# Patient Record
Sex: MALE | Race: ASIAN | NOT HISPANIC OR LATINO | URBAN - METROPOLITAN AREA
[De-identification: names, ages, dates, MRNs, and addresses within clinical notes are randomized per-mention and may not be internally consistent; named-entity substitution may affect disease eponyms.]

---

## 2023-11-28 ENCOUNTER — OFFICE VISIT (OUTPATIENT)
Dept: RHEUMATOLOGY | Facility: CLINIC | Age: 33
End: 2023-11-28
Payer: COMMERCIAL

## 2023-11-28 VITALS
SYSTOLIC BLOOD PRESSURE: 124 MMHG | HEIGHT: 70 IN | WEIGHT: 182.63 LBS | HEART RATE: 78 BPM | BODY MASS INDEX: 26.15 KG/M2 | DIASTOLIC BLOOD PRESSURE: 83 MMHG

## 2023-11-28 DIAGNOSIS — M45.9 ANKYLOSING SPONDYLITIS, UNSPECIFIED SITE OF SPINE: Primary | ICD-10-CM

## 2023-11-28 DIAGNOSIS — M54.9 CHRONIC BILATERAL BACK PAIN, UNSPECIFIED BACK LOCATION: ICD-10-CM

## 2023-11-28 DIAGNOSIS — H20.9 IRITIS OF BOTH EYES: ICD-10-CM

## 2023-11-28 DIAGNOSIS — G89.29 CHRONIC BILATERAL BACK PAIN, UNSPECIFIED BACK LOCATION: ICD-10-CM

## 2023-11-28 PROCEDURE — 3008F PR BODY MASS INDEX (BMI) DOCUMENTED: ICD-10-PCS | Mod: CPTII,S$GLB,, | Performed by: STUDENT IN AN ORGANIZED HEALTH CARE EDUCATION/TRAINING PROGRAM

## 2023-11-28 PROCEDURE — 3074F SYST BP LT 130 MM HG: CPT | Mod: CPTII,S$GLB,, | Performed by: STUDENT IN AN ORGANIZED HEALTH CARE EDUCATION/TRAINING PROGRAM

## 2023-11-28 PROCEDURE — 3008F BODY MASS INDEX DOCD: CPT | Mod: CPTII,S$GLB,, | Performed by: STUDENT IN AN ORGANIZED HEALTH CARE EDUCATION/TRAINING PROGRAM

## 2023-11-28 PROCEDURE — 3079F DIAST BP 80-89 MM HG: CPT | Mod: CPTII,S$GLB,, | Performed by: STUDENT IN AN ORGANIZED HEALTH CARE EDUCATION/TRAINING PROGRAM

## 2023-11-28 PROCEDURE — 99205 OFFICE O/P NEW HI 60 MIN: CPT | Mod: S$GLB,,, | Performed by: STUDENT IN AN ORGANIZED HEALTH CARE EDUCATION/TRAINING PROGRAM

## 2023-11-28 PROCEDURE — 3079F PR MOST RECENT DIASTOLIC BLOOD PRESSURE 80-89 MM HG: ICD-10-PCS | Mod: CPTII,S$GLB,, | Performed by: STUDENT IN AN ORGANIZED HEALTH CARE EDUCATION/TRAINING PROGRAM

## 2023-11-28 PROCEDURE — 99999 PR PBB SHADOW E&M-EST. PATIENT-LVL IV: CPT | Mod: PBBFAC,,, | Performed by: STUDENT IN AN ORGANIZED HEALTH CARE EDUCATION/TRAINING PROGRAM

## 2023-11-28 PROCEDURE — 99999 PR PBB SHADOW E&M-EST. PATIENT-LVL IV: ICD-10-PCS | Mod: PBBFAC,,, | Performed by: STUDENT IN AN ORGANIZED HEALTH CARE EDUCATION/TRAINING PROGRAM

## 2023-11-28 PROCEDURE — 3074F PR MOST RECENT SYSTOLIC BLOOD PRESSURE < 130 MM HG: ICD-10-PCS | Mod: CPTII,S$GLB,, | Performed by: STUDENT IN AN ORGANIZED HEALTH CARE EDUCATION/TRAINING PROGRAM

## 2023-11-28 PROCEDURE — 1159F MED LIST DOCD IN RCRD: CPT | Mod: CPTII,S$GLB,, | Performed by: STUDENT IN AN ORGANIZED HEALTH CARE EDUCATION/TRAINING PROGRAM

## 2023-11-28 PROCEDURE — 1159F PR MEDICATION LIST DOCUMENTED IN MEDICAL RECORD: ICD-10-PCS | Mod: CPTII,S$GLB,, | Performed by: STUDENT IN AN ORGANIZED HEALTH CARE EDUCATION/TRAINING PROGRAM

## 2023-11-28 PROCEDURE — 99205 PR OFFICE/OUTPT VISIT, NEW, LEVL V, 60-74 MIN: ICD-10-PCS | Mod: S$GLB,,, | Performed by: STUDENT IN AN ORGANIZED HEALTH CARE EDUCATION/TRAINING PROGRAM

## 2023-11-28 NOTE — PROGRESS NOTES
RHEUMATOLOGY OUTPATIENT CLINIC NOTE    11/28/2023    Attending Rheumatologist: Jerri Jesus  Primary Care Provider: No, Primary Doctor   Physician Requesting Consultation: Self, Aaareferral  No address on file  Chief Complaint/Reason For Consultation:  Joint Pain      Subjective:       HPI  Yandel Melo is a 33 y.o.  male with no past medical history who comes to establish care for ankylosing spondylitis.     No records available for review.     He was diagnosed with AS at age 22 when he developed low back and buttock pain, neck stiffness, had limited ROM. HLA-B27 positive and elevated markers of inflammation. He has XR done at the time, he does not recall if they were abnormal. No MRI has been done. He also developed iritis around the same time that has involved both eyes at different times.     No psoriasis, peripheral joint pain/swelling, abdominal pain, blood in stool.     Previous meds  -SSZ at dx for 1 year. Stopped for unknown reasons but he did not think it helped.   -Aleve as needed for 3 years.   -Enbrel started 2 years ago. Took for 1 year. He self tapered it as he did not want to take any medications. Has been off for the past 9 months.     Today reports that he has been doing well in regards to his back pain. Reports baseline mid to low back and neck stiffness, especially in the morning for 5-10 min, improves with stretching. He has not been very active lately.   However, iritis was quiet until 4 years ago when he has had a couple of episodes that required prednisolone drops except 3 months ago when he needed oral steroids. He follows with local ophthalmologist Dr. Johny Brown.   He moved from [place] about 6 months ago.    Does not smoke. Drinks alcohol socially. No illicit drugs. 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      Denies any family history of autoimmune conditions.     Chronic comorbid conditions affecting medical decision making today:  Past Medical  "History:   Diagnosis Date    Allergy     Ankylosing spondylitis of unspecified sites in spine      History reviewed. No pertinent surgical history.  History reviewed. No pertinent family history.  Social History     Substance and Sexual Activity   Alcohol Use Yes    Alcohol/week: 2.0 standard drinks of alcohol    Types: 2 Drinks containing 0.5 oz of alcohol per week    Comment: Socially     Social History     Tobacco Use   Smoking Status Never   Smokeless Tobacco Never     Social History     Substance and Sexual Activity   Drug Use Not on file     No current outpatient medications on file.     Objective:         Vitals:    11/28/23 1105   BP: 124/83   Pulse: 78     Physical Exam   Constitutional: He is oriented to person, place, and time. He appears well-developed.   HENT:   Head: Normocephalic.   Eyes: Conjunctivae are normal.   Cardiovascular: Normal rate.   Pulmonary/Chest: Effort normal.   Abdominal: Soft.   Musculoskeletal:      Cervical back: Neck supple.      Comments: Cspine FROM no tenderness  Tspine FROM no tenderness  Lspine FROM no tenderness.  Shoulders: FROM; no synovitis;  Elbows: FROM; no synovitis; no tophi or nodules  Wrists: FROM; no synovitis;    MCPs: FROM; no synovitis;   PIPs:FROM; no synovitis;   DIPs: FROM; no synovitis;   HIPS: FROM  Knees: FROM; no synovitis; no instability  Ankles: FROM: no synovitis   Toes: no tenderness on palpation.      Occipitut to wall 0 cm  Forward flexion normal.   Lateral flexion normal.    Neurological: He is alert and oriented to person, place, and time.   Skin: No rash noted.   No Heliotrope rash  No Gottron papules  No sclerodactyly   No Raynaud's  No Petechiae  No discoid lesions  No alopecia  Normal Capillaroscopy   Vitals reviewed.      Reviewed old and all outside pertinent medical records available.    All lab results personally reviewed and interpreted by me.  No results found for: "WBC", "HGB", "HCT", "MCV", "MCH", "MCHC", "RDW", "PLT", "MPV", " ""NEUTROABS", "LYMPHOABS", "MONOABS", "EOSINOABS", "BASOSABS", "NEUTROPCT", "LYMPHOPCT", "MONOPCT", "EOSINOPCT", "BASOPCT", "DIFFTYPE", "RBCMORPHOLOG", "PLTEST"    No results found for: "NA", "K", "CL", "CO2", "GLU", "BUN", "LABCREA", "CALCIUM", "PROT", "ALBUMIN", "BILITOT", "AST", "ALKPHOS", "ALT", "GFRAA", "GFRNONAA"    No results found for: "COLORU", "APPEARANCEUA", "SPECGRAV", "PHUR", "PHUA", "PROTEINUA", "GLUCOSEU", "KETONESU", "BLOODU", "LEUKOCYTESUR", "NITRITE", "UROBILINOGEN"    No results found for: "CRP"    No results found for: "AZEB", "RF", "SEDRATE", "CCPANTIBODIE"    No components found for: "25OHVITDTOT", "50LVRPEH8", "98PIVFRN2", "METHODNOTE"    No results found for: "URICACID"    No results found for: "QUANTIFERON", "HEPBCOREIGG", "HEPBSAB", "HEPBSAG", "HEPCAB"    Imaging:  All imaging reviewed and independently interpreted by me.     ASSESSMENT / PLAN:     Yandel Melo is a 33 y.o.  male with:    1. Ankylosing spondylitis, unspecified site of spine  -Diagnosed at age 22 when he had low back and buttock pain, neck stiffness. HLAB27 positive and elevated markers of inflammation. Also has history of bilateral iritis.   -No PsO, peripheral joint pain, abdominal pain, blood in stool.   -Meds tried: SSZ, Enbrel, NSAIDS. Prednisolone drops and recently prednisone oral for iritis.   -Obtain basic workup now including hepatitis panel and Quantiferon.   -Obtain XR of cervical, thoracic, lumbar spine, and sacroiliac joints.   -Referral to PT.   -He is not very symptomatic from the AS standpoint, so would recommend using NSAIDS as needed and continue stretching.   -given recent major iritis flare, can consider Humira in the future if iritis worsens.     2. Iritis of both eyes  -Diagnosis concomitant with AS around age 22.   -He remained stable until 4 years ago when he started having flares at least once a year that required prednisolone drops. However recently had major one that required oral prednisone. "   -Discussed with patient that if he develops a new flare       Follow up in about 3 months (around 2/28/2024).    Method of contact with patient concerns: [facility]    Disclaimer:  This note is prepared using voice recognition software and as such is likely to have errors and has not been proof read. Please contact me for questions.     Time spent: 60 minutes in face to face discussion concerning diagnosis, prognosis, review of lab and test results, benefits of treatment as well as management of disease, counseling of patient and coordination of care between various health care providers.  Greater than half the time spent was used for coordination of care and counseling of patient.    Jerri Jesus M.D.  Rheumatology  [facility]

## 2023-11-28 NOTE — PATIENT INSTRUCTIONS
Labs and XR today   Referral for physical therapy  Please let me know if you develop an episode of iritis again. If so, would likely start Humira.   Can take aleve as needed for back pain   Continue close follow up with Dr. Cantrell (Ophthalmology)

## 2023-11-28 NOTE — LETTER
November 28, 2023        Johny Brown MD  [address]  [address]  [place] [address]             [facility] - Rheumatology  [address]  [address]  [place] [address]  Phone: [phone]  Fax: [phone]   Patient: Yandel Melo   MR Number: [id number]   YOB: 1990   Date of Visit: 11/28/2023       Dear Dr. Brown:    Thank you for referring Yandel Melo to me for evaluation. Attached you will find relevant portions of my assessment and plan of care.    If you have questions, please do not hesitate to call me. I look forward to following Yandel Melo along with you.    Sincerely,      Jerri Jesus MD  Rheumatology          [facility]  [facility]    Enclosure

## 2023-12-01 ENCOUNTER — PATIENT MESSAGE (OUTPATIENT)
Dept: RHEUMATOLOGY | Facility: CLINIC | Age: 33
End: 2023-12-01
Payer: COMMERCIAL

## 2024-03-06 ENCOUNTER — OFFICE VISIT (OUTPATIENT)
Dept: RHEUMATOLOGY | Facility: CLINIC | Age: 34
End: 2024-03-06
Payer: COMMERCIAL

## 2024-03-06 VITALS
DIASTOLIC BLOOD PRESSURE: 88 MMHG | WEIGHT: 179.38 LBS | BODY MASS INDEX: 25.68 KG/M2 | HEART RATE: 77 BPM | SYSTOLIC BLOOD PRESSURE: 136 MMHG | HEIGHT: 70 IN

## 2024-03-06 DIAGNOSIS — M54.9 CHRONIC BILATERAL BACK PAIN, UNSPECIFIED BACK LOCATION: ICD-10-CM

## 2024-03-06 DIAGNOSIS — G89.29 CHRONIC BILATERAL BACK PAIN, UNSPECIFIED BACK LOCATION: ICD-10-CM

## 2024-03-06 DIAGNOSIS — H20.9 IRITIS OF BOTH EYES: ICD-10-CM

## 2024-03-06 DIAGNOSIS — M45.9 ANKYLOSING SPONDYLITIS, UNSPECIFIED SITE OF SPINE: Primary | ICD-10-CM

## 2024-03-06 PROCEDURE — 3008F BODY MASS INDEX DOCD: CPT | Mod: CPTII,S$GLB,, | Performed by: STUDENT IN AN ORGANIZED HEALTH CARE EDUCATION/TRAINING PROGRAM

## 2024-03-06 PROCEDURE — 99214 OFFICE O/P EST MOD 30 MIN: CPT | Mod: S$GLB,,, | Performed by: STUDENT IN AN ORGANIZED HEALTH CARE EDUCATION/TRAINING PROGRAM

## 2024-03-06 PROCEDURE — 3075F SYST BP GE 130 - 139MM HG: CPT | Mod: CPTII,S$GLB,, | Performed by: STUDENT IN AN ORGANIZED HEALTH CARE EDUCATION/TRAINING PROGRAM

## 2024-03-06 PROCEDURE — 3079F DIAST BP 80-89 MM HG: CPT | Mod: CPTII,S$GLB,, | Performed by: STUDENT IN AN ORGANIZED HEALTH CARE EDUCATION/TRAINING PROGRAM

## 2024-03-06 PROCEDURE — 1159F MED LIST DOCD IN RCRD: CPT | Mod: CPTII,S$GLB,, | Performed by: STUDENT IN AN ORGANIZED HEALTH CARE EDUCATION/TRAINING PROGRAM

## 2024-03-06 PROCEDURE — 99999 PR PBB SHADOW E&M-EST. PATIENT-LVL III: CPT | Mod: PBBFAC,,, | Performed by: STUDENT IN AN ORGANIZED HEALTH CARE EDUCATION/TRAINING PROGRAM

## 2024-03-06 NOTE — PROGRESS NOTES
RHEUMATOLOGY OUTPATIENT CLINIC NOTE    3/6/2024    Attending Rheumatologist: Jerri Jesus  Primary Care Provider: No, Primary Doctor   Physician Requesting Consultation: No referring provider defined for this encounter.  Chief Complaint/Reason For Consultation:  Ankylosing spondylitis, unspecified site of spine      Subjective:       HPI  Yandel Melo is a 33 y.o.  male with no past medical history who comes to establish care for ankylosing spondylitis.     Interim history  -Initial consult on 11/2023  -Stable from AS standpoint. Continues to not have any issues, had some back pain recently that resolved on its own. Did not take any NSAIDS. He was supposed to see PT but they did not call him back to get it scheduled.   -He has not have any flares or iritis.   -he brought some lab records from previous rheumatologist that showed negative AZEB. Had mild elevated LFTs in 2013.     Answers submitted by the patient for this visit:  Rheumatology Questionnaire (Submitted on 3/6/2024)  fever: No  eye redness: No  mouth sores: No  headaches: No  shortness of breath: No  chest pain: No  trouble swallowing: No  diarrhea: No  constipation: No  unexpected weight change: No  genital sore: No  During the last 3 days, have you had a skin rash?: No  Bruises or bleeds easily: No  cough: No      Disease history    He was diagnosed with AS at age 22 when he developed low back and buttock pain, neck stiffness, had limited ROM. HLA-B27 positive and elevated markers of inflammation. He has XR done at the time, he does not recall if they were abnormal. No MRI has been done. He also developed iritis around the same time that has involved both eyes at different times.     No psoriasis, peripheral joint pain/swelling, abdominal pain, blood in stool.     Previous meds  -SSZ at dx for 1 year. Stopped for unknown reasons but he did not think it helped.   -Aleve as needed for 3 years.   -Enbrel started 2 years ago. Took for 1  year. He self tapered it as he did not want to take any medications. Has been off for the past 9 months.     Today reports that he has been doing well in regards to his back pain. Reports baseline mid to low back and neck stiffness, especially in the morning for 5-10 min, improves with stretching. He has not been very active lately.   However, iritis was quiet until 4 years ago when he has had a couple of episodes that required prednisolone drops except 3 months ago when he needed oral steroids. He follows with local ophthalmologist Dr. Johny Brown.   He moved from [place] about 6 months ago.    Does not smoke. Drinks alcohol socially. No illicit drugs. 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      Denies any family history of autoimmune conditions.     Chronic comorbid conditions affecting medical decision making today:  Past Medical History:   Diagnosis Date    Allergy     Ankylosing spondylitis of unspecified sites in spine      History reviewed. No pertinent surgical history.  History reviewed. No pertinent family history.  Social History     Substance and Sexual Activity   Alcohol Use Yes    Alcohol/week: 2.0 standard drinks of alcohol    Types: 2 Drinks containing 0.5 oz of alcohol per week    Comment: Socially     Social History     Tobacco Use   Smoking Status Never   Smokeless Tobacco Never     Social History     Substance and Sexual Activity   Drug Use Not on file     No current outpatient medications on file.     Objective:         Vitals:    03/06/24 1041   BP: 136/88   Pulse: 77     Physical Exam   Constitutional: He is oriented to person, place, and time. He appears well-developed.   HENT:   Head: Normocephalic.   Eyes: Conjunctivae are normal.   Cardiovascular: Normal rate.   Pulmonary/Chest: Effort normal.   Abdominal: Soft.   Musculoskeletal:      Cervical back: Neck supple.      Comments: Cspine FROM no tenderness  Tspine FROM no tenderness  Lspine FROM no tenderness.  Shoulders: FROM;  "no synovitis;  Elbows: FROM; no synovitis; no tophi or nodules  Wrists: FROM; no synovitis;    MCPs: FROM; no synovitis;   PIPs:FROM; no synovitis;   DIPs: FROM; no synovitis;   HIPS: FROM  Knees: FROM; no synovitis; no instability  Ankles: FROM: no synovitis   Toes: no tenderness on palpation.      Occipitut to wall 0 cm  Forward flexion normal.   Lateral flexion normal.    Neurological: He is alert and oriented to person, place, and time.   Skin: No rash noted.   No Heliotrope rash  No Gottron papules  No sclerodactyly   No Raynaud's  No Petechiae  No discoid lesions  No alopecia  Normal Capillaroscopy   Vitals reviewed.      Reviewed old and all outside pertinent medical records available.    All lab results personally reviewed and interpreted by me.  Lab Results   Component Value Date    WBC 6.76 11/28/2023    HGB 14.9 11/28/2023    HCT 44.4 11/28/2023    MCV 88 11/28/2023    MCH 29.7 11/28/2023    MCHC 33.6 11/28/2023    RDW 12.3 11/28/2023     11/28/2023    MPV 9.3 11/28/2023       Lab Results   Component Value Date     11/28/2023    K 4.3 11/28/2023     11/28/2023    CO2 26 11/28/2023     11/28/2023    BUN 16 11/28/2023    CALCIUM 9.7 11/28/2023    PROT 8.3 11/28/2023    ALBUMIN 4.6 11/28/2023    BILITOT 0.6 11/28/2023    AST 36 11/28/2023    ALKPHOS 53 11/28/2023    ALT 35 11/28/2023       No results found for: "COLORU", "APPEARANCEUA", "SPECGRAV", "PHUR", "PHUA", "PROTEINUA", "GLUCOSEU", "KETONESU", "BLOODU", "LEUKOCYTESUR", "NITRITE", "UROBILINOGEN"    Lab Results   Component Value Date    CRP 10.3 (H) 11/28/2023       Lab Results   Component Value Date    SEDRATE 5 11/28/2023       No components found for: "25OHVITDTOT", "36WWASIK0", "06CFSXIS1", "METHODNOTE"    No results found for: "URICACID"    Lab Results   Component Value Date    HEPBSAB <3.00 11/28/2023    HEPBSAB Non-reactive 11/28/2023    HEPBSAG Non-reactive 11/28/2023    HEPCAB Non-reactive 11/28/2023 "       Imaging:  All imaging reviewed and independently interpreted by me.     ASSESSMENT / PLAN:     Yandel Melo is a 33 y.o.  male with:    1. Ankylosing spondylitis, unspecified site of spine  -Diagnosed at age 22 when he had low back and buttock pain, neck stiffness. HLAB27 positive and elevated markers of inflammation. Also has history of bilateral iritis.   -No PsO, peripheral joint pain, abdominal pain, blood in stool.   -Meds tried: SSZ, Enbrel, NSAIDS. Prednisolone drops and recently prednisone oral for iritis.   -Labs unremarkable with exception of mild elevation of CRP likely from acute flare of iritis.   -XR of spine unremarkable  -He is not very symptomatic from the AS standpoint, so would recommend using NSAIDS as needed and continue stretching. Ccan consider Humira in the future if iritis worsens.     2. Iritis of both eyes  -Diagnosis concomitant with AS around age 22.   -He remained stable until 4 years ago when he started having flares at least once a year that required prednisolone drops. Had a major flare that required oral prednisone in Mid October 2023. No recurrence so far.   -follows with ophthalmology.   -Discussed with patient that if he develops a new flare, will consider Humira.     Follow up in about 6 months (around 9/6/2024).    Method of contact with patient concerns: Guy attn Rheumatology    Disclaimer:  This note is prepared using voice recognition software and as such is likely to have errors and has not been proof read. Please contact me for questions.     Time spent: 30 minutes in face to face discussion concerning diagnosis, prognosis, review of lab and test results, benefits of treatment as well as management of disease, counseling of patient and coordination of care between various health care providers.  Greater than half the time spent was used for coordination of care and counseling of patient.    Jerri Jesus M.D.  Rheumatology  [facility]

## 2024-03-06 NOTE — PROGRESS NOTES
3/6/2024    10:38 AM   Rapid3 Question Responses and Scores   MDHAQ Score 0.1   Psychologic Score 1.1   Pain Score 4.5   When you awakened in the morning OVER THE LAST WEEK, did you feel stiff? Yes   If Yes, please indicate the number of hours until you are as limber as you will be for the day 2   Fatigue Score 0   Global Health Score 0   RAPID3 Score 1.61

## 2024-08-29 ENCOUNTER — PATIENT MESSAGE (OUTPATIENT)
Dept: RHEUMATOLOGY | Facility: CLINIC | Age: 34
End: 2024-08-29
Payer: COMMERCIAL

## 2024-12-04 ENCOUNTER — PATIENT MESSAGE (OUTPATIENT)
Dept: RHEUMATOLOGY | Facility: CLINIC | Age: 34
End: 2024-12-04
Payer: COMMERCIAL

## 2024-12-04 DIAGNOSIS — H20.9 IRITIS OF BOTH EYES: ICD-10-CM

## 2024-12-04 DIAGNOSIS — M45.9 ANKYLOSING SPONDYLITIS, UNSPECIFIED SITE OF SPINE: Primary | ICD-10-CM

## 2025-01-13 ENCOUNTER — OFFICE VISIT (OUTPATIENT)
Dept: RHEUMATOLOGY | Facility: CLINIC | Age: 35
End: 2025-01-13
Payer: COMMERCIAL

## 2025-01-13 VITALS
SYSTOLIC BLOOD PRESSURE: 154 MMHG | DIASTOLIC BLOOD PRESSURE: 96 MMHG | WEIGHT: 183.88 LBS | HEIGHT: 70 IN | BODY MASS INDEX: 26.33 KG/M2

## 2025-01-13 DIAGNOSIS — Z79.899 DRUG-INDUCED IMMUNODEFICIENCY: ICD-10-CM

## 2025-01-13 DIAGNOSIS — D84.821 DRUG-INDUCED IMMUNODEFICIENCY: ICD-10-CM

## 2025-01-13 DIAGNOSIS — H20.9 IRITIS OF BOTH EYES: ICD-10-CM

## 2025-01-13 DIAGNOSIS — M45.9 ANKYLOSING SPONDYLITIS, UNSPECIFIED SITE OF SPINE: Primary | ICD-10-CM

## 2025-01-13 PROCEDURE — 3080F DIAST BP >= 90 MM HG: CPT | Mod: CPTII,S$GLB,, | Performed by: STUDENT IN AN ORGANIZED HEALTH CARE EDUCATION/TRAINING PROGRAM

## 2025-01-13 PROCEDURE — 3077F SYST BP >= 140 MM HG: CPT | Mod: CPTII,S$GLB,, | Performed by: STUDENT IN AN ORGANIZED HEALTH CARE EDUCATION/TRAINING PROGRAM

## 2025-01-13 PROCEDURE — 99999 PR PBB SHADOW E&M-EST. PATIENT-LVL III: CPT | Mod: PBBFAC,,, | Performed by: STUDENT IN AN ORGANIZED HEALTH CARE EDUCATION/TRAINING PROGRAM

## 2025-01-13 PROCEDURE — 99215 OFFICE O/P EST HI 40 MIN: CPT | Mod: S$GLB,,, | Performed by: STUDENT IN AN ORGANIZED HEALTH CARE EDUCATION/TRAINING PROGRAM

## 2025-01-13 PROCEDURE — 1159F MED LIST DOCD IN RCRD: CPT | Mod: CPTII,S$GLB,, | Performed by: STUDENT IN AN ORGANIZED HEALTH CARE EDUCATION/TRAINING PROGRAM

## 2025-01-13 PROCEDURE — 3008F BODY MASS INDEX DOCD: CPT | Mod: CPTII,S$GLB,, | Performed by: STUDENT IN AN ORGANIZED HEALTH CARE EDUCATION/TRAINING PROGRAM

## 2025-01-13 RX ORDER — PREDNISOLONE ACETATE 10 MG/ML
SUSPENSION/ DROPS OPHTHALMIC
COMMUNITY
Start: 2024-12-09

## 2025-01-13 RX ORDER — ADALIMUMAB 40MG/0.4ML
40 KIT SUBCUTANEOUS
Qty: 2 PEN | Refills: 11 | Status: ACTIVE | OUTPATIENT
Start: 2025-01-13 | End: 2025-02-03

## 2025-01-13 RX ORDER — PREDNISONE 20 MG/1
TABLET ORAL
COMMUNITY
Start: 2024-12-13

## 2025-01-13 NOTE — PROGRESS NOTES
1/13/2025    11:52 AM   Rapid3 Question Responses and Scores   MDHAQ Score 0.2   Psychologic Score 1.1   Pain Score 4   When you awakened in the morning OVER THE LAST WEEK, did you feel stiff? Yes   If Yes, please indicate the number of hours until you are as limber as you will be for the day 3   Fatigue Score 2   Global Health Score 3   RAPID3 Score 2.56

## 2025-01-13 NOTE — PROGRESS NOTES
RHEUMATOLOGY OUTPATIENT CLINIC NOTE    1/13/2025    Attending Rheumatologist: Jerri Jesus  Primary Care Provider: No, Primary Doctor   Physician Requesting Consultation: No referring provider defined for this encounter.  Chief Complaint/Reason For Consultation:  Ankylosing spondylitis, unspecified site of spine      Subjective:       HPI  Yandel Melo is a 34 y.o.  male with no past medical history who comes to establish care for ankylosing spondylitis.     Interim history  -last visit on 3/2024  -he has been doing well until mid December, when he developed flare of iritis that required both PO steroids and drops of prednisolone. He is currently on prednisone 10 mg every other day and he will be stopping this upcoming Wednesday.   -He reports that his back pain has slowly worsened. He noted waking up at 4 am or so he wakes up with pain and improves in the middle of the day.     Answers submitted by the patient for this visit:  Rheumatology Questionnaire (Submitted on 1/13/2025)  fever: No  eye redness: Yes  mouth sores: No  headaches: No  shortness of breath: No  chest pain: No  trouble swallowing: No  diarrhea: No  constipation: No  unexpected weight change: No  genital sore: No  During the last 3 days, have you had a skin rash?: No  Bruises or bleeds easily: No  cough: No      Disease history    He was diagnosed with AS at age 22 when he developed low back and buttock pain, neck stiffness, had limited ROM. HLA-B27 positive and elevated markers of inflammation. He has XR done at the time, he does not recall if they were abnormal. No MRI has been done. He also developed iritis around the same time that has involved both eyes at different times.     No psoriasis, peripheral joint pain/swelling, abdominal pain, blood in stool.     Previous meds  -SSZ at dx for 1 year. Stopped for unknown reasons but he did not think it helped.   -Aleve as needed for 3 years.   -Enbrel started 2 years ago. Took  for 1 year. He self tapered it as he did not want to take any medications. Has been off for the past 9 months.     Today reports that he has been doing well in regards to his back pain. Reports baseline mid to low back and neck stiffness, especially in the morning for 5-10 min, improves with stretching. He has not been very active lately.   However, iritis was quiet until 4 years ago when he has had a couple of episodes that required prednisolone drops except 3 months ago when he needed oral steroids. He follows with local ophthalmologist Dr. Johny Brown.   He moved from [place] about 6 months ago.    Does not smoke. Drinks alcohol socially. No illicit drugs. 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      Denies any family history of autoimmune conditions.     Chronic comorbid conditions affecting medical decision making today:  Past Medical History:   Diagnosis Date    Allergy     Ankylosing spondylitis of unspecified sites in spine      History reviewed. No pertinent surgical history.  No family history on file.  Social History     Substance and Sexual Activity   Alcohol Use Yes    Alcohol/week: 2.0 standard drinks of alcohol    Types: 2 Drinks containing 0.5 oz of alcohol per week    Comment: Socially     Social History     Tobacco Use   Smoking Status Never   Smokeless Tobacco Never     Social History     Substance and Sexual Activity   Drug Use Not on file       Current Outpatient Medications:     PRED FORTE 1 % DrpS, , Disp: , Rfl:     predniSONE (DELTASONE) 20 MG tablet, , Disp: , Rfl:     adalimumab (HUMIRA,CF, PEN) 40 mg/0.4 mL PnKt, Inject 0.4 mLs (40 mg total) into the skin every 14 (fourteen) days., Disp: 2 pen , Rfl: 11     Objective:         Vitals:    01/13/25 1340   BP: (!) 154/96     Physical Exam   Constitutional: He is oriented to person, place, and time. He appears well-developed.   HENT:   Head: Normocephalic.   Eyes: Conjunctivae are normal.   Cardiovascular: Normal rate.  "  Pulmonary/Chest: Effort normal.   Abdominal: Soft.   Musculoskeletal:      Cervical back: Neck supple.      Comments: Cspine FROM no tenderness  Tspine FROM no tenderness  Lspine FROM no tenderness.  Shoulders: FROM; no synovitis;  Elbows: FROM; no synovitis; no tophi or nodules  Wrists: FROM; no synovitis;    MCPs: FROM; no synovitis;   PIPs:FROM; no synovitis;   DIPs: FROM; no synovitis;   HIPS: FROM  Knees: FROM; no synovitis; no instability  Ankles: FROM: no synovitis   Toes: no tenderness on palpation.      Occipitut to wall 0 cm  Forward flexion normal.   Lateral flexion normal.    Neurological: He is alert and oriented to person, place, and time.   Skin: No rash noted.   No Heliotrope rash  No Gottron papules  No sclerodactyly   No Raynaud's  No Petechiae  No discoid lesions  No alopecia  Normal Capillaroscopy   Vitals reviewed.      Reviewed old and all outside pertinent medical records available.    All lab results personally reviewed and interpreted by me.  Lab Results   Component Value Date    WBC 6.76 11/28/2023    HGB 14.9 11/28/2023    HCT 44.4 11/28/2023    MCV 88 11/28/2023    MCH 29.7 11/28/2023    MCHC 33.6 11/28/2023    RDW 12.3 11/28/2023     11/28/2023    MPV 9.3 11/28/2023       Lab Results   Component Value Date     11/28/2023    K 4.3 11/28/2023     11/28/2023    CO2 26 11/28/2023     11/28/2023    BUN 16 11/28/2023    CALCIUM 9.7 11/28/2023    PROT 8.3 11/28/2023    ALBUMIN 4.6 11/28/2023    BILITOT 0.6 11/28/2023    AST 36 11/28/2023    ALKPHOS 53 11/28/2023    ALT 35 11/28/2023       No results found for: "COLORU", "APPEARANCEUA", "SPECGRAV", "PHUR", "PHUA", "PROTEINUA", "GLUCOSEU", "KETONESU", "BLOODU", "LEUKOCYTESUR", "NITRITE", "UROBILINOGEN"    Lab Results   Component Value Date    CRP 10.3 (H) 11/28/2023       Lab Results   Component Value Date    SEDRATE 5 11/28/2023       No components found for: "25OHVITDTOT", "45TRLFFA1", "75HVDKTD2", "METHODNOTE"    No " "results found for: "URICACID"    Lab Results   Component Value Date    HEPBSAB <3.00 11/28/2023    HEPBSAB Non-reactive 11/28/2023    HEPBSAG Non-reactive 11/28/2023    HEPCAB Non-reactive 11/28/2023       Imaging:  All imaging reviewed and independently interpreted by me.     ASSESSMENT / PLAN:     Yandel Melo is a 34 y.o.  male with:    1. Ankylosing spondylitis, unspecified site of spine -active  -Diagnosed at age 22 when he had low back and buttock pain, neck stiffness. HLAB27 positive and elevated markers of inflammation. Also has history of bilateral iritis.   -No PsO, peripheral joint pain, abdominal pain, blood in stool.   -Meds tried: SSZ, Enbrel, NSAIDS. Prednisolone drops and recently prednisone oral for iritis.   -XR of spine unremarkable  -symptoms worsened recently with active iritis and worsening back pain. Will proceed with starting Humira 40 mg SC every 2 weeks. Discussed SE profile of Humira. He voiced understanding and agrees with starting medication.     2. Iritis of both eyes- active   -Diagnosis concomitant with AS around age 22.   -He remained stable until 4 years ago when he started having flares at least once a year that required prednisolone drops. Had a major flare in December 2024 that required PO steroids   -follows with ophthalmology Dr. Johny Brown. Will obtain records from him.   -management as above too. Humira should help.     3. Drug induced immunodeficiency  - obtain labs now   - no live vaccines  - vaccines per guidelines   - immunosuppression/infectious precautions reinforced      No follow-ups on file.    Method of contact with patient concerns: Guy attn Rheumatology    Disclaimer:  This note is prepared using voice recognition software and as such is likely to have errors and has not been proof read. Please contact me for questions.   patient.    Jerri Jesus M.D.  Rheumatology  [facility]    "

## 2025-09-04 ENCOUNTER — OFFICE VISIT (OUTPATIENT)
Dept: RHEUMATOLOGY | Facility: CLINIC | Age: 35
End: 2025-09-04
Payer: COMMERCIAL

## 2025-09-04 VITALS
HEIGHT: 70 IN | WEIGHT: 179.25 LBS | HEART RATE: 70 BPM | SYSTOLIC BLOOD PRESSURE: 145 MMHG | BODY MASS INDEX: 25.66 KG/M2 | DIASTOLIC BLOOD PRESSURE: 94 MMHG | OXYGEN SATURATION: 98 % | RESPIRATION RATE: 19 BRPM

## 2025-09-04 DIAGNOSIS — M45.9 ANKYLOSING SPONDYLITIS, UNSPECIFIED SITE OF SPINE: ICD-10-CM

## 2025-09-04 DIAGNOSIS — Z79.899 DRUG-INDUCED IMMUNODEFICIENCY: Primary | ICD-10-CM

## 2025-09-04 DIAGNOSIS — H20.9 IRITIS OF BOTH EYES: ICD-10-CM

## 2025-09-04 DIAGNOSIS — D84.821 DRUG-INDUCED IMMUNODEFICIENCY: Primary | ICD-10-CM

## 2025-09-04 PROCEDURE — 3080F DIAST BP >= 90 MM HG: CPT | Mod: CPTII,S$GLB,, | Performed by: STUDENT IN AN ORGANIZED HEALTH CARE EDUCATION/TRAINING PROGRAM

## 2025-09-04 PROCEDURE — 99999 PR PBB SHADOW E&M-EST. PATIENT-LVL III: CPT | Mod: PBBFAC,,, | Performed by: STUDENT IN AN ORGANIZED HEALTH CARE EDUCATION/TRAINING PROGRAM

## 2025-09-04 PROCEDURE — G2211 COMPLEX E/M VISIT ADD ON: HCPCS | Mod: S$GLB,,, | Performed by: STUDENT IN AN ORGANIZED HEALTH CARE EDUCATION/TRAINING PROGRAM

## 2025-09-04 PROCEDURE — 3008F BODY MASS INDEX DOCD: CPT | Mod: CPTII,S$GLB,, | Performed by: STUDENT IN AN ORGANIZED HEALTH CARE EDUCATION/TRAINING PROGRAM

## 2025-09-04 PROCEDURE — 3077F SYST BP >= 140 MM HG: CPT | Mod: CPTII,S$GLB,, | Performed by: STUDENT IN AN ORGANIZED HEALTH CARE EDUCATION/TRAINING PROGRAM

## 2025-09-04 PROCEDURE — 1159F MED LIST DOCD IN RCRD: CPT | Mod: CPTII,S$GLB,, | Performed by: STUDENT IN AN ORGANIZED HEALTH CARE EDUCATION/TRAINING PROGRAM

## 2025-09-04 PROCEDURE — 99214 OFFICE O/P EST MOD 30 MIN: CPT | Mod: S$GLB,,, | Performed by: STUDENT IN AN ORGANIZED HEALTH CARE EDUCATION/TRAINING PROGRAM

## 2025-09-04 RX ORDER — ADALIMUMAB-ADBM 40MG/0.4ML
40 KIT SUBCUTANEOUS
Qty: 2 PEN | Refills: 11 | Status: ACTIVE | OUTPATIENT
Start: 2025-09-04